# Patient Record
Sex: FEMALE | Race: WHITE | URBAN - METROPOLITAN AREA
[De-identification: names, ages, dates, MRNs, and addresses within clinical notes are randomized per-mention and may not be internally consistent; named-entity substitution may affect disease eponyms.]

---

## 2022-01-14 ENCOUNTER — OFFICE VISIT (OUTPATIENT)
Dept: URBAN - METROPOLITAN AREA CLINIC 88 | Facility: CLINIC | Age: 57
End: 2022-01-14
Payer: COMMERCIAL

## 2022-01-14 DIAGNOSIS — H16.211 EXPOSURE KERATOCONJUNCTIVITIS, RIGHT EYE: ICD-10-CM

## 2022-01-14 DIAGNOSIS — H04.123 DRY EYE SYNDROME OF BILATERAL LACRIMAL GLANDS: ICD-10-CM

## 2022-01-14 DIAGNOSIS — H52.4 PRESBYOPIA: Primary | ICD-10-CM

## 2022-01-14 DIAGNOSIS — H50.111 MONOCULAR EXOTROPIA, RIGHT EYE: ICD-10-CM

## 2022-01-14 DIAGNOSIS — H25.13 AGE-RELATED NUCLEAR CATARACT, BILATERAL: ICD-10-CM

## 2022-01-14 PROCEDURE — 92004 COMPRE OPH EXAM NEW PT 1/>: CPT | Performed by: OPTOMETRIST

## 2022-01-14 ASSESSMENT — INTRAOCULAR PRESSURE
OD: 16
OS: 16

## 2022-01-14 NOTE — IMPRESSION/PLAN
Impression: Monocular exotropia, right eye: H50.111. Plan: Longstanding condition. H/o failed adult strab surgery.   Monitor

## 2022-01-14 NOTE — IMPRESSION/PLAN
Impression: Presbyopia: H52.4. Plan: A thorough review of the ocular findings was discussed.   Patient is happy with readers

## 2022-01-14 NOTE — IMPRESSION/PLAN
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 She has used gel at times, but not using consistently currently. Ed pt to use gel or ointment in both eyes at bedtime and continue with patch over right eye while sleeping.

## 2024-05-16 ENCOUNTER — OFFICE VISIT (OUTPATIENT)
Dept: URBAN - METROPOLITAN AREA CLINIC 88 | Facility: CLINIC | Age: 59
End: 2024-05-16
Payer: MEDICARE

## 2024-05-16 DIAGNOSIS — H18.451 NODULAR CORNEAL DEGENERATION, RIGHT EYE: Primary | ICD-10-CM

## 2024-05-16 DIAGNOSIS — H50.9 STRABISMUS: ICD-10-CM

## 2024-05-16 PROCEDURE — 99203 OFFICE O/P NEW LOW 30 MIN: CPT | Performed by: OPHTHALMOLOGY

## 2024-05-16 ASSESSMENT — INTRAOCULAR PRESSURE
OD: 14
OS: 15

## 2025-04-09 ENCOUNTER — OFFICE VISIT (OUTPATIENT)
Dept: URBAN - METROPOLITAN AREA CLINIC 88 | Facility: CLINIC | Age: 60
End: 2025-04-09
Payer: MEDICARE

## 2025-04-09 DIAGNOSIS — H50.9 STRABISMUS: ICD-10-CM

## 2025-04-09 DIAGNOSIS — H18.451 NODULAR CORNEAL DEGENERATION, RIGHT EYE: Primary | ICD-10-CM

## 2025-04-09 DIAGNOSIS — H04.123 DRY EYE SYNDROME OF BILATERAL LACRIMAL GLANDS: ICD-10-CM

## 2025-04-09 PROCEDURE — 99212 OFFICE O/P EST SF 10 MIN: CPT | Performed by: OPHTHALMOLOGY

## 2025-04-09 ASSESSMENT — INTRAOCULAR PRESSURE
OS: 11
OD: 11

## 2025-08-18 ENCOUNTER — OFFICE VISIT (OUTPATIENT)
Dept: URBAN - METROPOLITAN AREA CLINIC 88 | Facility: CLINIC | Age: 60
End: 2025-08-18
Payer: MEDICARE

## 2025-08-18 DIAGNOSIS — H04.123 DRY EYE SYNDROME OF BILATERAL LACRIMAL GLANDS: ICD-10-CM

## 2025-08-18 DIAGNOSIS — H18.451 NODULAR CORNEAL DEGENERATION, RIGHT EYE: Primary | ICD-10-CM

## 2025-08-18 DIAGNOSIS — H50.9 STRABISMUS: ICD-10-CM

## 2025-08-18 PROCEDURE — 99212 OFFICE O/P EST SF 10 MIN: CPT | Performed by: OPHTHALMOLOGY

## 2025-08-18 ASSESSMENT — INTRAOCULAR PRESSURE
OS: 11
OD: 15